# Patient Record
(demographics unavailable — no encounter records)

---

## 2024-11-01 NOTE — CONSULT LETTER
[Dear  ___] : Dear  [unfilled], [Consult Letter:] : I had the pleasure of evaluating your patient, [unfilled]. [Please see my note below.] : Please see my note below. [Consult Closing:] : Thank you very much for allowing me to participate in the care of this patient.  If you have any questions, please do not hesitate to contact me. [Sincerely,] : Sincerely, [FreeTextEntry2] : Dr. Breanna Velez (ref/PCP) [FreeTextEntry3] : Arnold Schultz MD, MPH System Director of Thoracic Surgery Director of Comprehensive Lung and Foregut [place] Professor Cardiovascular & Thoracic Surgery  [facility] [employer] [facility] at [facility]

## 2024-11-01 NOTE — CONSULT LETTER
[Dear  ___] : Dear  [unfilled], [Consult Letter:] : I had the pleasure of evaluating your patient, [unfilled]. [Please see my note below.] : Please see my note below. [Consult Closing:] : Thank you very much for allowing me to participate in the care of this patient.  If you have any questions, please do not hesitate to contact me. [Sincerely,] : Sincerely, [FreeTextEntry2] : Dr. Breanna Velez (ref/PCP) [FreeTextEntry3] : Arnold Schultz MD, MPH System Director of Thoracic Surgery Director of Comprehensive Lung and Foregut [place] Professor Cardiovascular & Thoracic Surgery  [facility] at [facility]

## 2024-11-01 NOTE — HISTORY OF PRESENT ILLNESS
[FreeTextEntry1] : Mr. SERAFIN LOWE, 62 year old male, former smoker (1 ppd since age 17, quit in 2019), daily alcohol (one bottle of beer nightly), w/ hx of HTN, HLD, thyroid CA s/p Lt thyroidectomy in 2019 (no chemo/XRT), who presented with new lung nodules, found on lung CA screening, referred by Dr. Breanna Velez (PCP).   CT Chest on 10/23/24: - new irregular solid nodule at the Rt lung apex 2 cm - additional new irregular solid nodule 1 cm within anterior segment of RUL - subsolid nodule 4 mm at apical segment of TASHA - tiny lingula nodule 2 mm  Pt presents today for CT Sx consultation. Admits to SOB on exertion. Denies CP, cough.

## 2024-11-01 NOTE — CONSULT LETTER
[Dear  ___] : Dear  [unfilled], [Consult Letter:] : I had the pleasure of evaluating your patient, [unfilled]. [Please see my note below.] : Please see my note below. [Consult Closing:] : Thank you very much for allowing me to participate in the care of this patient.  If you have any questions, please do not hesitate to contact me. [Sincerely,] : Sincerely, [FreeTextEntry2] : Dr. Breanna Velez (ref/PCP) [FreeTextEntry3] : Arnold Schultz MD, MPH System Director of Thoracic Surgery Director of Comprehensive Lung and Foregut [place] Professor Cardiovascular & Thoracic Surgery  [facility] [employer] [facility] at [facility]

## 2024-11-01 NOTE — ASSESSMENT
[FreeTextEntry1] : Mr. SERAFIN LOWE, 62 year old male, former smoker (1 ppd since age 17, quit in 2019), daily alcohol (one bottle of beer nightly), w/ hx of HTN, HLD, thyroid CA s/p Lt thyroidectomy in 2019 (no chemo/XRT), who presented with new lung nodules, found on lung CA screening, referred by Dr. Breanna Velez (PCP).   I have reviewed the patient's medical records and diagnostic images at time of this office consultation and have made the following recommendation: 1. CT scan reviewed, two new RUL nodules are suspicious, I recommended a Rt VATS R.A. wedge rxn of RUL x2, possible lobectomy, MLND on 11/13/24. Risks and benefits and alternatives explained to patient, all questions answered, patient agreed to proceed with surgery. 2. PET/CT 3. PFTs 4. Medical clearance and PST   I, REYNALDO Maria, personally performed the evaluation and management (E/M) services for this new patient.  That E/M includes conducting the initial examination, assessing all conditions, and establishing the plan of care.  Today, my ACP, Jessy Suazo, YEN-BC was here to observe my evaluation and management services for this patient to be followed going forward.

## 2024-11-25 NOTE — CONSULT LETTER
[Dear  ___] : Dear  [unfilled], [Consult Letter:] : I had the pleasure of evaluating your patient, [unfilled]. [Please see my note below.] : Please see my note below. [Consult Closing:] : Thank you very much for allowing me to participate in the care of this patient.  If you have any questions, please do not hesitate to contact me. [Sincerely,] : Sincerely, [FreeTextEntry2] : Dr. Breanna Velez (ref/PCP) [FreeTextEntry3] : Arnold Schultz MD, MPH System Director of Thoracic Surgery Director of Comprehensive Lung and Foregut [place] Professor Cardiovascular & Thoracic Surgery  [facility] at [facility]

## 2024-11-25 NOTE — ASSESSMENT
[FreeTextEntry1] : Mr. SERAFIN LOWE, 62 year old male, former smoker (1 ppd since age 17, quit in 2019), daily alcohol (one bottle of beer nightly), w/ hx of HTN, HLD, thyroid CA s/p Lt thyroidectomy in 2019 (no chemo/XRT), who presented with new lung nodules, found on lung CA screening, referred by Dr. Breanna Velez (PCP).  CT Chest on 10/23/24: - new irregular solid nodule at the Rt lung apex 2 cm - additional new irregular solid nodule 1 cm within anterior segment of RUL - subsolid nodule 4 mm at apical segment of TASHA - tiny lingula nodule 2 mm  PFTs on 11/4/24: FVC 97%, FEV1 94%, DLCO 103%   PET/CT on 11/5/24:  - Active right mediastinal and hilar lymph nodes are noted measuring up to 9 mm (SUV max 4.4) on image 58. - 20 mm right upper lobe solid irregular active pulmonary nodule measures up to SUV max 7.4 on image 48.  - 6 mm right upper lobe pulmonary nodule  Now s/p Rt VATS, R.A., wedge rxn of RUL, MLND on 11/13/24. Path  .....   CXR today----  I have reviewed the patient's medical records and diagnostic images at time of this office consultation and have made the following recommendation: 1.

## 2024-12-05 NOTE — DISCUSSION/SUMMARY
[Doing Well] : is doing well [Excellent Pain Control] : has excellent pain control [No Sign of Infection] : is showing no signs of infection [3] : 3 [Remove Sutures/Staples] : removed sutures/staples [de-identified] : increased sensation

## 2024-12-05 NOTE — CONSULT LETTER
[FreeTextEntry2] : Dr. Breanna Velez (ref/PCP) [FreeTextEntry3] : Arnold Schultz MD, MPH System Director of Thoracic Surgery Director of Comprehensive Lung and Foregut [place] Professor Cardiovascular & Thoracic Surgery  [facility] at [facility]

## 2024-12-05 NOTE — DISCUSSION/SUMMARY
Pt rates pain 2/10 on pain scale after NGT administration   [Doing Well] : is doing well [Excellent Pain Control] : has excellent pain control [No Sign of Infection] : is showing no signs of infection [3] : 3 [Remove Sutures/Staples] : removed sutures/staples [de-identified] : increased sensation

## 2024-12-05 NOTE — CONSULT LETTER
[FreeTextEntry2] : Dr. Breanna Velez (ref/PCP) [FreeTextEntry3] : Arnold Schultz MD, MPH System Director of Thoracic Surgery Director of Comprehensive Lung and Foregut [place] Professor Cardiovascular & Thoracic Surgery  [facility] at [facility]

## 2024-12-05 NOTE — ASSESSMENT
[FreeTextEntry1] : Mr. SERAFIN LOWE, 62 year old male, former smoker (1 ppd since age 17, quit in 2019), daily alcohol (one bottle of beer nightly), w/ hx of HTN, HLD, thyroid CA s/p Lt thyroidectomy in 2019 (no chemo/XRT), who presented with new lung nodules, found on lung CA screening, referred by Dr. Breanna Velez (PCP).  CT Chest on 10/23/24: - new irregular solid nodule at the Rt lung apex 2 cm - additional new irregular solid nodule 1 cm within anterior segment of RUL - subsolid nodule 4 mm at apical segment of TASHA - tiny lingula nodule 2 mm  PFTs on 11/4/24: FVC 97%, FEV1 94%, DLCO 103%   PET/CT on 11/5/24:  - Active right mediastinal and hilar lymph nodes are noted measuring up to 9 mm (SUV max 4.4) on image 58. - 20 mm right upper lobe solid irregular active pulmonary nodule measures up to SUV max 7.4 on image 48.  - 6 mm right upper lobe pulmonary nodule  Now s/p Rt VATS, R.A., wedge rxn of RUL, MLND on 11/13/24. Path revealed lung tissue with vaguely granulomatous inflammation with central suppuration surrounded by chronic inflammation with organizing pneumonia. Special stains are negative for bacteria, fungi, and acid-fast bacilli (AFB, GMS, Gram, and Warthin-Starry stain).  While the special stains are negative an infectious etiology is favored for the necrotizing granuloma as well as the surrounding organizing pneumonia. Lymphocytic inflammation is present and was evaluated by immunohistochemistry and in situ hybridization.  The admixture of T cells by CD3 and CD43 stains as well as the presence of nodular aggregates of CD20 positive B cells are in keeping with a reactive lymphoid population. In addition, kappa and lambda in situ hybridization shows a mixture of kappa and lambda positive plasma cells (with  showing the overall plasma cell population) also in keeping with a reactive process  CXR today---- reviewed.  I have reviewed the patient's medical records and diagnostic images at time of this office consultation and have made the following recommendation: 1. Path reviewed and explained to patient, negative for malignancy, RTC as needed 2. Refer to Pulm Dr. Blade Aquino 3. Gabapentin 300mg QHS   I, Dr. ALICEAREYNALDO, personally performed the evaluation and management (E/M) services for this established patient who presents today with (a) new problem(s)/exacerbation of (an) existing condition(s). That E/M includes conducting the examination, assessing all new/exacerbated conditions, and establishing a new plan of care. Today, my ACP, Chelo Wall NP was here to observe my evaluation and management services for this new problem/exacerbated condition to be followed going forward.